# Patient Record
Sex: MALE | URBAN - METROPOLITAN AREA
[De-identification: names, ages, dates, MRNs, and addresses within clinical notes are randomized per-mention and may not be internally consistent; named-entity substitution may affect disease eponyms.]

---

## 2017-03-01 ENCOUNTER — APPOINTMENT (RX ONLY)
Dept: URBAN - METROPOLITAN AREA CLINIC 81 | Facility: CLINIC | Age: 82
Setting detail: DERMATOLOGY
End: 2017-03-01

## 2017-03-01 VITALS — DIASTOLIC BLOOD PRESSURE: 85 MMHG | SYSTOLIC BLOOD PRESSURE: 149 MMHG | HEART RATE: 73 BPM

## 2017-03-01 DIAGNOSIS — B35.3 TINEA PEDIS: ICD-10-CM

## 2017-03-01 DIAGNOSIS — L98429 CHRONIC ULCER OF OTHER SPECIFIED SITES: ICD-10-CM

## 2017-03-01 DIAGNOSIS — L98419 CHRONIC ULCER OF OTHER SPECIFIED SITES: ICD-10-CM

## 2017-03-01 DIAGNOSIS — L40.0 PSORIASIS VULGARIS: ICD-10-CM

## 2017-03-01 DIAGNOSIS — J06.9 ACUTE UPPER RESPIRATORY INFECTION, UNSPECIFIED: ICD-10-CM

## 2017-03-01 DIAGNOSIS — Z79.899 OTHER LONG TERM (CURRENT) DRUG THERAPY: ICD-10-CM

## 2017-03-01 DIAGNOSIS — B35.1 TINEA UNGUIUM: ICD-10-CM

## 2017-03-01 DIAGNOSIS — L81.4 OTHER MELANIN HYPERPIGMENTATION: ICD-10-CM

## 2017-03-01 PROBLEM — L29.8 OTHER PRURITUS: Status: ACTIVE | Noted: 2017-03-01

## 2017-03-01 PROBLEM — L98.499 NON-PRESSURE CHRONIC ULCER OF SKIN OF OTHER SITES WITH UNSPECIFIED SEVERITY: Status: ACTIVE | Noted: 2017-03-01

## 2017-03-01 PROCEDURE — ? NAIL CLIPPING FOR PAS

## 2017-03-01 PROCEDURE — ? ORDER TESTS

## 2017-03-01 PROCEDURE — ? TREATMENT REGIMEN

## 2017-03-01 PROCEDURE — ? HIGH RISK MEDICATION MONITORING

## 2017-03-01 PROCEDURE — ? PRESCRIPTION

## 2017-03-01 PROCEDURE — 99214 OFFICE O/P EST MOD 30 MIN: CPT

## 2017-03-01 PROCEDURE — ? COUNSELING

## 2017-03-01 RX ORDER — MUPIROCIN 20 MG/G
OINTMENT TOPICAL BID
Qty: 1 | Refills: 0 | Status: ERX | COMMUNITY
Start: 2017-03-01

## 2017-03-01 RX ORDER — KETOCONAZOLE 20 MG/G
CREAM TOPICAL BID
Qty: 1 | Refills: 4 | Status: ERX | COMMUNITY
Start: 2017-03-01

## 2017-03-01 RX ORDER — MOMETASONE FUROATE 1 MG/G
CREAM TOPICAL
Qty: 1 | Refills: 0 | Status: ERX | COMMUNITY
Start: 2017-03-01

## 2017-03-01 RX ORDER — BETAMETHASONE DIPROPIONATE 0.5 MG/G
LOTION TOPICAL
Qty: 1 | Refills: 2 | Status: ERX

## 2017-03-01 RX ADMIN — KETOCONAZOLE: 20 CREAM TOPICAL at 00:00

## 2017-03-01 RX ADMIN — MUPIROCIN: 20 OINTMENT TOPICAL at 00:00

## 2017-03-01 RX ADMIN — MOMETASONE FUROATE: 1 CREAM TOPICAL at 00:00

## 2017-03-01 ASSESSMENT — LOCATION SIMPLE DESCRIPTION DERM
LOCATION SIMPLE: POSTERIOR SCALP
LOCATION SIMPLE: LEFT UPPER ARM
LOCATION SIMPLE: RIGHT PRETIBIAL REGION
LOCATION SIMPLE: RIGHT FOOT
LOCATION SIMPLE: UPPER BACK
LOCATION SIMPLE: RIGHT GREAT TOE
LOCATION SIMPLE: LEFT PRETIBIAL REGION
LOCATION SIMPLE: LEFT FOOT
LOCATION SIMPLE: RIGHT UPPER ARM
LOCATION SIMPLE: GROIN

## 2017-03-01 ASSESSMENT — LOCATION ZONE DERM
LOCATION ZONE: SCALP
LOCATION ZONE: LEG
LOCATION ZONE: FEET
LOCATION ZONE: TOENAIL
LOCATION ZONE: ARM
LOCATION ZONE: TRUNK

## 2017-03-01 ASSESSMENT — LOCATION DETAILED DESCRIPTION DERM
LOCATION DETAILED: RIGHT GREAT TOENAIL
LOCATION DETAILED: RIGHT PROXIMAL PRETIBIAL REGION
LOCATION DETAILED: LEFT PROXIMAL PRETIBIAL REGION
LOCATION DETAILED: LEFT INGUINAL CREASE
LOCATION DETAILED: 1ST WEBSPACE RIGHT FOOT
LOCATION DETAILED: 1ST WEBSPACE LEFT FOOT
LOCATION DETAILED: LEFT PROXIMAL POSTERIOR UPPER ARM
LOCATION DETAILED: RIGHT DISTAL POSTERIOR UPPER ARM
LOCATION DETAILED: RIGHT INGUINAL CREASE
LOCATION DETAILED: POSTERIOR MID-PARIETAL SCALP
LOCATION DETAILED: INFERIOR THORACIC SPINE

## 2017-03-01 NOTE — PROCEDURE: TREATMENT REGIMEN
Continue Regimen: Betamethasone dipropionate 0.05% lotion - apply to affected areas of scalp once daily prn \\nCordran lotion 0.05% - apply to affected areas of posterior ears bid prn
Action 2: Continue
Decrease Regimen: Enbrel SureClick 50 mg/ml (0.98 ml) subcutaneous pen injector - inject 1 (50 mg pen) SubQ every other week (instead of 1 every week)
Start Regimen: Mometasone 0.1% cream - apply to affected areas of thighs, gluteal cleft, and perianal area twice daily for 2 weeks. Wait one week prior to repeating prn.
Samples Given: Cordran 0.05% lotion
Detail Level: Zone
Initiate Treatment: Ketoconazole 2% cream - apply to both feet and in between toes bid x 30 days
Initiate Treatment: Mupirocin 2% ointment - apply twice daily x 7 days to ulceration in the inguinal creases
Plan: Patient to follow up with an ENT for further evaluation and management

## 2017-03-01 NOTE — PROCEDURE: ORDER TESTS
Bill For Surgical Tray: no
Performing Laboratory: [id number]
Billing Type: Third-Party Bill
Billing Type: Third-Party Bill
Performing Laboratory: [id number]

## 2017-03-01 NOTE — PROCEDURE: NAIL CLIPPING FOR PAS
Add [id number] To Bill?: No
Lab: [id number]
Lab Facility: [id number]
Detail Level: Detailed
Billing Type: Third-Party Bill

## 2017-03-01 NOTE — PROCEDURE: COUNSELING
Quality 337: Tuberculosis Prevention For Psoriasis And Psoriatic Arthritis Patients On A Biological Immune Response Modifier: Patients has documentation of the management of a positive TB screening test with no evidence of active tuberculosis, confirmed through use of radiographic imaging (i.e., chest x -ray, CT)
Detail Level: Zone
Detail Level: Detailed
Detail Level: Generalized

## 2017-03-01 NOTE — PROCEDURE: HIGH RISK MEDICATION MONITORING
Quality 337: Tuberculosis Prevention For Psoriasis And Psoriatic Arthritis Patients On A Biological Immune Response Modifier: Patients has documentation of the management of a positive TB screening test with no evidence of active tuberculosis, confirmed through use of radiographic imaging (i.e., chest x -ray, CT)
Detail Level: Detailed

## 2017-03-06 ENCOUNTER — RX ONLY (OUTPATIENT)
Age: 82
Setting detail: RX ONLY
End: 2017-03-06

## 2017-03-06 RX ORDER — MUPIROCIN 20 MG/G
OINTMENT TOPICAL
Qty: 1 | Refills: 0 | Status: ERX

## 2017-03-06 RX ORDER — AMOXICILLIN AND CLAVULANATE POTASSIUM 500; 125 MG/1; 1/1
TABLET, FILM COATED ORAL
Qty: 14 | Refills: 0 | Status: ERX | COMMUNITY
Start: 2017-03-06

## 2017-04-13 ENCOUNTER — RX ONLY (OUTPATIENT)
Age: 82
Setting detail: RX ONLY
End: 2017-04-13

## 2017-04-13 RX ORDER — ETANERCEPT 50 MG/ML
SOLUTION SUBCUTANEOUS
Qty: 12 | Refills: 1 | Status: CANCELLED
Stop reason: CLARIF

## 2017-05-25 ENCOUNTER — RX ONLY (OUTPATIENT)
Age: 82
Setting detail: RX ONLY
End: 2017-05-25

## 2017-05-25 RX ORDER — ETANERCEPT 50 MG/ML
SOLUTION SUBCUTANEOUS
Qty: 12 | Refills: 0 | Status: ERX | COMMUNITY
Start: 2017-05-25

## 2017-08-17 ENCOUNTER — RX ONLY (OUTPATIENT)
Age: 82
Setting detail: RX ONLY
End: 2017-08-17

## 2017-08-17 RX ORDER — HYDROXYZINE HYDROCHLORIDE 10 MG/1
TABLET, FILM COATED ORAL
Qty: 60 | Refills: 3 | Status: CANCELLED
Stop reason: CLARIF

## 2017-09-06 ENCOUNTER — APPOINTMENT (RX ONLY)
Dept: URBAN - METROPOLITAN AREA CLINIC 81 | Facility: CLINIC | Age: 82
Setting detail: DERMATOLOGY
End: 2017-09-06

## 2017-09-06 DIAGNOSIS — L40.0 PSORIASIS VULGARIS: ICD-10-CM | Status: IMPROVED

## 2017-09-06 DIAGNOSIS — D17 BENIGN LIPOMATOUS NEOPLASM: ICD-10-CM

## 2017-09-06 DIAGNOSIS — L81.0 POSTINFLAMMATORY HYPERPIGMENTATION: ICD-10-CM

## 2017-09-06 DIAGNOSIS — Z79.899 OTHER LONG TERM (CURRENT) DRUG THERAPY: ICD-10-CM

## 2017-09-06 PROBLEM — D17.0 BENIGN LIPOMATOUS NEOPLASM OF SKIN AND SUBCUTANEOUS TISSUE OF HEAD, FACE AND NECK: Status: ACTIVE | Noted: 2017-09-06

## 2017-09-06 PROCEDURE — ? TREATMENT REGIMEN

## 2017-09-06 PROCEDURE — 99213 OFFICE O/P EST LOW 20 MIN: CPT

## 2017-09-06 PROCEDURE — ? ORDER TESTS

## 2017-09-06 PROCEDURE — ? COUNSELING

## 2017-09-06 PROCEDURE — ? HIGH RISK MEDICATION MONITORING

## 2017-09-06 ASSESSMENT — LOCATION DETAILED DESCRIPTION DERM
LOCATION DETAILED: INFERIOR THORACIC SPINE
LOCATION DETAILED: LEFT POSTERIOR NECK

## 2017-09-06 ASSESSMENT — LOCATION SIMPLE DESCRIPTION DERM
LOCATION SIMPLE: UPPER BACK
LOCATION SIMPLE: POSTERIOR NECK

## 2017-09-06 ASSESSMENT — LOCATION ZONE DERM
LOCATION ZONE: NECK
LOCATION ZONE: TRUNK

## 2017-09-06 NOTE — PROCEDURE: TREATMENT REGIMEN
Action 2: Continue
Detail Level: Zone
Other Instructions: patient given lab slip again to have necessary routine labs drawn for high risk medication monitoring\\n\\nf/u 6 months
Continue Regimen: Enbrel SureClick 50mg/mL SC  - inject one pen every other week\\nMometasone 0.1% cream - apply to affected areas of thighs, gluteal cleft, and perianal area twice daily for 2 weeks. Wait one week prior to repeating prn. \\nBetamethasone dipropionate 0.05 % lotion (Apply to affected areas of the scalp once daily prn\\n

## 2017-09-06 NOTE — PROCEDURE: MIPS QUALITY
Quality 337: Tuberculosis Prevention For Psoriasis And Psoriatic Arthritis Patients On A Biological Immune Response Modifier: Patients has documentation of the management of a positive TB screening test with no evidence of active tuberculosis, confirmed through use of radiographic imaging (i.e., chest x -ray, CT)
Quality 130: Documentation Of Current Medications In The Medical Record: Current Medications Documented
Detail Level: Detailed

## 2017-09-06 NOTE — PROCEDURE: ORDER TESTS
Expected Date Of Service: 09/06/2017
Lab Facility: [id number]
Billing Type: Third-Party Bill
Performing Laboratory: [id number]
Bill For Surgical Tray: no
Lab Facility: [id number]
Performing Laboratory: [id number]
Billing Type: Third-Party Bill

## 2018-01-26 ENCOUNTER — RX ONLY (OUTPATIENT)
Age: 83
Setting detail: RX ONLY
End: 2018-01-26

## 2018-01-26 RX ORDER — ETANERCEPT 50 MG/ML
SOLUTION SUBCUTANEOUS
Qty: 12 | Refills: 0 | Status: ERX

## 2018-03-15 ENCOUNTER — APPOINTMENT (RX ONLY)
Dept: URBAN - METROPOLITAN AREA CLINIC 81 | Facility: CLINIC | Age: 83
Setting detail: DERMATOLOGY
End: 2018-03-15

## 2018-03-15 DIAGNOSIS — L72.8 OTHER FOLLICULAR CYSTS OF THE SKIN AND SUBCUTANEOUS TISSUE: ICD-10-CM

## 2018-03-15 DIAGNOSIS — L40.8 OTHER PSORIASIS: ICD-10-CM

## 2018-03-15 PROCEDURE — 99213 OFFICE O/P EST LOW 20 MIN: CPT | Mod: 25

## 2018-03-15 PROCEDURE — ? INCISION AND DRAINAGE

## 2018-03-15 PROCEDURE — ? COUNSELING

## 2018-03-15 PROCEDURE — ? PRESCRIPTION

## 2018-03-15 PROCEDURE — 10060 I&D ABSCESS SIMPLE/SINGLE: CPT

## 2018-03-15 RX ORDER — MOMETASONE FUROATE 1 MG/G
CREAM TOPICAL QD
Qty: 1 | Refills: 2 | Status: ERX

## 2018-03-15 ASSESSMENT — PAIN INTENSITY VAS: HOW INTENSE IS YOUR PAIN 0 BEING NO PAIN, 10 BEING THE MOST SEVERE PAIN POSSIBLE?: NO PAIN

## 2018-03-15 ASSESSMENT — LOCATION DETAILED DESCRIPTION DERM: LOCATION DETAILED: LEFT INFERIOR LID MARGIN

## 2018-03-15 ASSESSMENT — LOCATION ZONE DERM: LOCATION ZONE: EYELID

## 2018-03-15 ASSESSMENT — LOCATION SIMPLE DESCRIPTION DERM: LOCATION SIMPLE: LEFT INFERIOR EYELID

## 2018-03-15 NOTE — PROCEDURE: INCISION AND DRAINAGE
Post-Care Instructions: I reviewed with the patient in detail post-care instructions. Patient should keep wound covered and call the office should any redness, pain, swelling or worsening occur.
Curette Text (Optional): After the contents were expressed a curette was used to partially remove the cyst wall.
Epidermal Closure: simple interrupted
Curette: No
Anesthesia Volume In Cc: 1
Suture Text: The incision was partially closed with
Lesion Type: Cyst
Dressing: no dressing
Preparation Text: The area was prepped in the usual clean fashion.
Anesthesia Type: 1% lidocaine with 1:100,000 epinephrine and 0.9% sodium chloride (tumescent anesthesia)
Method: comedo extractor
Detail Level: Detailed
Epidermal Sutures: 4-0 Ethilon
Size Of Lesion In Cm (Optional But May Be Required For Some Insurances): 0
Consent was obtained and risks were reviewed including but not limited to delayed wound healing, infection, need for multiple I and D's, and pain.

## 2018-08-30 ENCOUNTER — APPOINTMENT (RX ONLY)
Dept: URBAN - METROPOLITAN AREA CLINIC 81 | Facility: CLINIC | Age: 83
Setting detail: DERMATOLOGY
End: 2018-08-30

## 2018-08-30 DIAGNOSIS — L40.8 OTHER PSORIASIS: ICD-10-CM

## 2018-08-30 PROBLEM — L40.0 PSORIASIS VULGARIS: Status: ACTIVE | Noted: 2018-08-30

## 2018-08-30 PROCEDURE — ? PRESCRIPTION

## 2018-08-30 PROCEDURE — ? TREATMENT REGIMEN

## 2018-08-30 PROCEDURE — 99213 OFFICE O/P EST LOW 20 MIN: CPT

## 2018-08-30 PROCEDURE — ? COUNSELING

## 2018-08-30 RX ORDER — TACROLIMUS 1 MG/G
OINTMENT TOPICAL BID
Qty: 1 | Refills: 1 | Status: ERX | COMMUNITY
Start: 2018-08-30

## 2018-08-30 RX ORDER — MOMETASONE FUROATE 1 MG/G
CREAM TOPICAL BID
Qty: 1 | Refills: 1 | Status: ERX

## 2018-08-30 RX ADMIN — TACROLIMUS: 1 OINTMENT TOPICAL at 00:00

## 2018-08-30 ASSESSMENT — LOCATION ZONE DERM: LOCATION ZONE: TRUNK

## 2018-08-30 ASSESSMENT — PGA PSORIASIS: PGA PSORIASIS: MARKED (MARKED PLAQUE ELEVATION, BRIGHT ERYTHEMA, THICK NONTENACIOUS SCALE PREDOMINATES)

## 2018-08-30 ASSESSMENT — LOCATION SIMPLE DESCRIPTION DERM
LOCATION SIMPLE: GROIN
LOCATION SIMPLE: GLUTEAL CLEFT

## 2018-08-30 ASSESSMENT — LOCATION DETAILED DESCRIPTION DERM
LOCATION DETAILED: LEFT INGUINAL CREASE
LOCATION DETAILED: RIGHT INGUINAL CREASE
LOCATION DETAILED: GLUTEAL CLEFT

## 2018-08-30 ASSESSMENT — BSA PSORIASIS: % BODY COVERED IN PSORIASIS: 2

## 2018-08-30 NOTE — PROCEDURE: TREATMENT REGIMEN
Action 3: Continue
Continue Regimen: Enbrel 50mg/mL sc injections every 2 weeks\\n\\nMometasone 0.1% cream mixed with Tacrolimus 0.1% cream applied BID (2 weeks on, 2 weeks off)
Other Instructions: Patient to consider switching to Taltz 80mg/mL sc injections if topical treatment is ineffective. \\n\\nPatient competed Taltz start paperwork. \\n\\nPatient sent with [facility] lab slip to complete maintenance labs
Detail Level: Zone

## 2018-10-17 ENCOUNTER — RX ONLY (OUTPATIENT)
Age: 83
Setting detail: RX ONLY
End: 2018-10-17

## 2018-10-17 RX ORDER — SECUKINUMAB 150 MG/ML
INJECTION SUBCUTANEOUS
Qty: 7 | Refills: 0 | Status: ERX | COMMUNITY
Start: 2018-10-17

## 2019-05-09 ENCOUNTER — APPOINTMENT (RX ONLY)
Dept: URBAN - METROPOLITAN AREA CLINIC 81 | Facility: CLINIC | Age: 84
Setting detail: DERMATOLOGY
End: 2019-05-09

## 2019-05-09 DIAGNOSIS — L81.0 POSTINFLAMMATORY HYPERPIGMENTATION: ICD-10-CM

## 2019-05-09 DIAGNOSIS — L40.0 PSORIASIS VULGARIS: ICD-10-CM | Status: WELL CONTROLLED

## 2019-05-09 DIAGNOSIS — B37.2 CANDIDIASIS OF SKIN AND NAIL: ICD-10-CM

## 2019-05-09 DIAGNOSIS — B35.1 TINEA UNGUIUM: ICD-10-CM

## 2019-05-09 PROCEDURE — ? ORDER TESTS

## 2019-05-09 PROCEDURE — 87220 TISSUE EXAM FOR FUNGI: CPT

## 2019-05-09 PROCEDURE — ? DEFER

## 2019-05-09 PROCEDURE — ? DIAGNOSIS COMMENT

## 2019-05-09 PROCEDURE — ? KOH PREP

## 2019-05-09 PROCEDURE — 99213 OFFICE O/P EST LOW 20 MIN: CPT

## 2019-05-09 PROCEDURE — ? PRESCRIPTION

## 2019-05-09 PROCEDURE — ? TREATMENT REGIMEN

## 2019-05-09 PROCEDURE — ? COUNSELING

## 2019-05-09 RX ORDER — KETOCONAZOLE 20 MG/G
CREAM TOPICAL BID
Qty: 1 | Refills: 2 | Status: ERX

## 2019-05-09 ASSESSMENT — LOCATION DETAILED DESCRIPTION DERM
LOCATION DETAILED: RIGHT GREAT TOENAIL
LOCATION DETAILED: LEFT INGUINAL CREASE
LOCATION DETAILED: INFERIOR LUMBAR SPINE
LOCATION DETAILED: LEFT INFERIOR OCCIPITAL SCALP
LOCATION DETAILED: LEFT OCCIPITAL SCALP
LOCATION DETAILED: GLUTEAL CLEFT
LOCATION DETAILED: RIGHT INGUINAL CREASE
LOCATION DETAILED: PERIUMBILICAL SKIN

## 2019-05-09 ASSESSMENT — LOCATION SIMPLE DESCRIPTION DERM
LOCATION SIMPLE: GLUTEAL CLEFT
LOCATION SIMPLE: ABDOMEN
LOCATION SIMPLE: POSTERIOR SCALP
LOCATION SIMPLE: GROIN
LOCATION SIMPLE: LOWER BACK
LOCATION SIMPLE: RIGHT GREAT TOE

## 2019-05-09 ASSESSMENT — LOCATION ZONE DERM
LOCATION ZONE: TRUNK
LOCATION ZONE: SCALP
LOCATION ZONE: TOENAIL

## 2019-05-09 ASSESSMENT — PGA PSORIASIS: PGA PSORIASIS: MILD (MILD PLAQUE ELEVATION, LIGHT ERYTHEMA, FINE SCALE PREDOMINATES)

## 2019-05-09 ASSESSMENT — BSA PSORIASIS: % BODY COVERED IN PSORIASIS: 2

## 2019-05-09 NOTE — PROCEDURE: DIAGNOSIS COMMENT
Comment: patient does not want to stop Enbrel treatment, even though another biologic modifier drug may work more effectively.
Detail Level: Simple

## 2019-05-09 NOTE — PROCEDURE: TREATMENT REGIMEN
Detail Level: Zone
Action 4: Continue
Other Instructions: Patient given [facility] lab slip today.
Continue Regimen: Enbrel 50mg/mL sc injections every week
Initiate Treatment: Ketoconazole 2% Cream BID x 1 month

## 2019-05-09 NOTE — PROCEDURE: DEFER
Procedure To Be Performed At Next Visit: Prescription
Detail Level: Detailed
Introduction Text (Please End With A Colon): The following procedure was deferred:

## 2019-05-09 NOTE — PROCEDURE: KOH PREP
Detail Level: Detailed
Showing: budding yeast and branching hyphae
Koh Procedure Text (Tissue Harvesting Technique): A 15-blade scalpel was used to scrape the skin. The skin scrapings were placed on a glass slide, covered with a coverslip and a KOH solution was applied.
Koh Intro Text (From The.....): A KOH prep was ordered and evaluated from the

## 2019-05-09 NOTE — PROCEDURE: ORDER TESTS
Expected Date Of Service: 05/09/2019
Performing Laboratory: [id number]
Bill For Surgical Tray: no
Billing Type: Third-Party Bill

## 2019-05-15 ENCOUNTER — RX ONLY (OUTPATIENT)
Age: 84
Setting detail: RX ONLY
End: 2019-05-15

## 2019-05-15 RX ORDER — MUPIROCIN 20 MG/G
1 OINTMENT TOPICAL BID
Qty: 1 | Refills: 0 | Status: ERX

## 2019-05-15 RX ORDER — CLINDAMYCIN HYDROCHLORIDE 300 MG/1
CAPSULE ORAL
Qty: 21 | Refills: 0 | Status: ERX | COMMUNITY
Start: 2019-05-15

## 2020-02-03 ENCOUNTER — RX ONLY (OUTPATIENT)
Age: 85
Setting detail: RX ONLY
End: 2020-02-03

## 2020-02-03 RX ORDER — ETANERCEPT 50 MG/ML
50 MG/ML SOLUTION SUBCUTANEOUS WEEKLY
Qty: 12 | Refills: 1 | Status: ERX | COMMUNITY
Start: 2020-02-03

## 2020-02-04 ENCOUNTER — RX ONLY (OUTPATIENT)
Age: 85
Setting detail: RX ONLY
End: 2020-02-04

## 2020-02-04 RX ORDER — ETANERCEPT 50 MG/ML
SOLUTION SUBCUTANEOUS WEEKLY
Qty: 4 | Refills: 1 | Status: ERX

## 2020-02-11 ENCOUNTER — APPOINTMENT (RX ONLY)
Dept: URBAN - METROPOLITAN AREA CLINIC 81 | Facility: CLINIC | Age: 85
Setting detail: DERMATOLOGY
End: 2020-02-11

## 2020-02-11 DIAGNOSIS — L40.0 PSORIASIS VULGARIS: ICD-10-CM | Status: WELL CONTROLLED

## 2020-02-11 DIAGNOSIS — L72.0 EPIDERMAL CYST: ICD-10-CM

## 2020-02-11 PROCEDURE — ? COUNSELING

## 2020-02-11 PROCEDURE — ? INCISION AND DRAINAGE

## 2020-02-11 PROCEDURE — ? TREATMENT REGIMEN

## 2020-02-11 PROCEDURE — ? PRESCRIPTION

## 2020-02-11 PROCEDURE — 10060 I&D ABSCESS SIMPLE/SINGLE: CPT

## 2020-02-11 PROCEDURE — 99213 OFFICE O/P EST LOW 20 MIN: CPT | Mod: 25

## 2020-02-11 RX ORDER — MOMETASONE FUROATE 1 MG/G
CREAM TOPICAL BID
Qty: 1 | Refills: 2 | Status: ERX

## 2020-02-11 ASSESSMENT — LOCATION ZONE DERM
LOCATION ZONE: EYELID
LOCATION ZONE: ANUS

## 2020-02-11 ASSESSMENT — LOCATION DETAILED DESCRIPTION DERM
LOCATION DETAILED: LEFT INFERIOR LID MARGIN
LOCATION DETAILED: PERIANAL SKIN

## 2020-02-11 ASSESSMENT — PGA PSORIASIS: PGA PSORIASIS: CLEAR (NO ELEVATION, SCALE, OR ERYTHEMA, EXCEPT FOR RESIDUAL DISCOLORATION)

## 2020-02-11 ASSESSMENT — BSA PSORIASIS: % BODY COVERED IN PSORIASIS: 0

## 2020-02-11 ASSESSMENT — LOCATION SIMPLE DESCRIPTION DERM
LOCATION SIMPLE: PERIANAL SKIN
LOCATION SIMPLE: LEFT INFERIOR EYELID

## 2020-02-11 NOTE — PROCEDURE: INCISION AND DRAINAGE
Detail Level: Detailed
Lesion Type: Cyst
Method: 11 blade
Curette: No
Anesthesia Type: 1% lidocaine with 1:100,000 epinephrine and a 1:10 solution of 8.4% sodium bicarbonate
Anesthesia Volume In Cc: 0.3
Size Of Lesion In Cm (Optional But May Be Required For Some Insurances): 0
Dressing: no dressing
Epidermal Sutures: 4-0 Ethilon
Epidermal Closure: simple interrupted
Suture Text: The incision was partially closed with
Preparation Text: The area was prepped in the usual clean fashion.
Curette Text (Optional): After the contents were expressed a curette was used to partially remove the cyst wall.
Consent was obtained and risks were reviewed including but not limited to delayed wound healing, infection, need for multiple I and D's, and pain.
Post-Care Instructions: I reviewed with the patient in detail post-care instructions. Patient should keep wound covered and call the office should any redness, pain, swelling or worsening occur.

## 2020-02-11 NOTE — PROCEDURE: TREATMENT REGIMEN
Action 4: Continue
Start Regimen: Mometasone 0.1% Cream BID up to 7 days (for perianal irritation)
Otc Regimen: Gentle cleanser daily (for perianal irritation)
Detail Level: Zone
Continue Regimen: Enbrel 50mg/mL sc injections every week

## 2020-02-11 NOTE — PROCEDURE: COUNSELING
Detail Level: Detailed
Quality 410: Psoriasis Clinical Response To Oral Systemic Or Biologic Mediations: Psoriasis Assessment Tool Documented, Met Specified Benchmark
Quality 337: Tuberculosis Prevention For Psoriasis And Psoriatic Arthritis Patients On A Biological Immune Response Modifier: Patients has documentation of the management of a positive TB screening test with no evidence of active tuberculosis, confirmed through use of radiographic imaging (i.e., chest x -ray, CT)

## 2020-02-11 NOTE — PROCEDURE: MIPS QUALITY
Quality 110: Preventive Care And Screening: Influenza Immunization: Influenza Immunization Administered during Influenza season
Quality 337: Tuberculosis Prevention For Psoriasis And Psoriatic Arthritis Patients On A Biological Immune Response Modifier: Patients has documentation of the management of a positive TB screening test with no evidence of active tuberculosis, confirmed through use of radiographic imaging (i.e., chest x -ray, CT)
Quality 226: Preventive Care And Screening: Tobacco Use: Screening And Cessation Intervention: Patient screened for tobacco use and is an ex/non-smoker
Detail Level: Detailed
Quality 410: Psoriasis Clinical Response To Oral Systemic Or Biologic Mediations: Psoriasis Assessment Tool Documented, Met Specified Benchmark
Quality 130: Documentation Of Current Medications In The Medical Record: Current Medications Documented

## 2020-09-04 ENCOUNTER — APPOINTMENT (RX ONLY)
Dept: URBAN - METROPOLITAN AREA CLINIC 81 | Facility: CLINIC | Age: 85
Setting detail: DERMATOLOGY
End: 2020-09-04

## 2020-09-04 DIAGNOSIS — Z79.899 OTHER LONG TERM (CURRENT) DRUG THERAPY: ICD-10-CM

## 2020-09-04 DIAGNOSIS — L72.0 EPIDERMAL CYST: ICD-10-CM

## 2020-09-04 DIAGNOSIS — L40.0 PSORIASIS VULGARIS: ICD-10-CM

## 2020-09-04 PROCEDURE — ? INCISION AND DRAINAGE

## 2020-09-04 PROCEDURE — ? HIGH RISK MEDICATION MONITORING

## 2020-09-04 PROCEDURE — ? TREATMENT REGIMEN

## 2020-09-04 PROCEDURE — ? COUNSELING

## 2020-09-04 PROCEDURE — 99213 OFFICE O/P EST LOW 20 MIN: CPT | Mod: 25

## 2020-09-04 PROCEDURE — 10060 I&D ABSCESS SIMPLE/SINGLE: CPT

## 2020-09-04 ASSESSMENT — LOCATION DETAILED DESCRIPTION DERM
LOCATION DETAILED: LEFT INFERIOR LID MARGIN
LOCATION DETAILED: PERIANAL SKIN

## 2020-09-04 ASSESSMENT — LOCATION ZONE DERM
LOCATION ZONE: ANUS
LOCATION ZONE: EYELID

## 2020-09-04 ASSESSMENT — LOCATION SIMPLE DESCRIPTION DERM
LOCATION SIMPLE: LEFT INFERIOR EYELID
LOCATION SIMPLE: PERIANAL SKIN

## 2020-09-04 ASSESSMENT — BSA PSORIASIS: % BODY COVERED IN PSORIASIS: 1

## 2020-09-04 NOTE — PROCEDURE: TREATMENT REGIMEN
Action 4: Continue
Otc Regimen: Gentle cleanser daily (for perianal irritation)
Detail Level: Zone
Continue Regimen: Enbrel 50mg/mL sc injections every week\\n\\nMometasone 0.1% Cream BID up to 7 days (for perianal irritation)

## 2020-09-04 NOTE — PROCEDURE: HIGH RISK MEDICATION MONITORING
Tetracycline Pregnancy And Lactation Text: This medication is Pregnancy Category D and not consider safe during pregnancy. It is also excreted in breast milk.
5-Fu Pregnancy And Lactation Text: This medication is Pregnancy Category X and contraindicated in pregnancy and in women who may become pregnant. It is unknown if this medication is excreted in breast milk.
Otezla Counseling: The side effects of Otezla were discussed with the patient, including but not limited to worsening or new depression, weight loss, diarrhea, nausea, upper respiratory tract infection, and headache. Patient instructed to call the office should any adverse effect occur.  The patient verbalized understanding of the proper use and possible adverse effects of Otezla.  All the patient's questions and concerns were addressed.
Ketoconazole Pregnancy And Lactation Text: This medication is Pregnancy Category C and it isn't know if it is safe during pregnancy. It is also excreted in breast milk and breast feeding isn't recommended.
Cosentyx Counseling:  I discussed with the patient the risks of Cosentyx including but not limited to worsening of Crohn's disease, immunosuppression, allergic reactions and infections.  The patient understands that monitoring is required including a PPD at baseline and must alert us or the primary physician if symptoms of infection or other concerning signs are noted.
Topical Sulfur Applications Counseling: Topical Sulfur Counseling: Patient counseled that this medication may cause skin irritation or allergic reactions.  In the event of skin irritation, the patient was advised to reduce the amount of the drug applied or use it less frequently.   The patient verbalized understanding of the proper use and possible adverse effects of topical sulfur application.  All of the patient's questions and concerns were addressed.
Glycopyrrolate Counseling:  I discussed with the patient the risks of glycopyrrolate including but not limited to skin rash, drowsiness, dry mouth, difficulty urinating, and blurred vision.
Cephalexin Counseling: I counseled the patient regarding use of cephalexin as an antibiotic for prophylactic and/or therapeutic purposes. Cephalexin (commonly prescribed under brand name Keflex) is a cephalosporin antibiotic which is active against numerous classes of bacteria, including most skin bacteria. Side effects may include nausea, diarrhea, gastrointestinal upset, rash, hives, yeast infections, and in rare cases, hepatitis, kidney disease, seizures, fever, confusion, neurologic symptoms, and others. Patients with severe allergies to penicillin medications are cautioned that there is about a 10% incidence of cross-reactivity with cephalosporins. When possible, patients with penicillin allergies should use alternatives to cephalosporins for antibiotic therapy.
Simponi Pregnancy And Lactation Text: The risk during pregnancy and breastfeeding is uncertain with this medication.
Thalidomide Pregnancy And Lactation Text: This medication is Pregnancy Category X and is absolutely contraindicated during pregnancy. It is unknown if it is excreted in breast milk.
Albendazole Pregnancy And Lactation Text: This medication is Pregnancy Category C and it isn't known if it is safe during pregnancy. It is also excreted in breast milk.
Xeljanz Counseling: I discussed with the patient the risks of Xeljanz therapy including increased risk of infection, liver issues, headache, diarrhea, or cold symptoms. Live vaccines should be avoided. They were instructed to call if they have any problems.
Solaraze Counseling:  I discussed with the patient the risks of Solaraze including but not limited to erythema, scaling, itching, weeping, crusting, and pain.
Drysol Counseling:  I discussed with the patient the risks of drysol/aluminum chloride including but not limited to skin rash, itching, irritation, burning.
Cyclophosphamide Counseling:  I discussed with the patient the risks of cyclophosphamide including but not limited to hair loss, hormonal abnormalities, decreased fertility, abdominal pain, diarrhea, nausea and vomiting, bone marrow suppression and infection. The patient understands that monitoring is required while taking this medication.
Imiquimod Counseling:  I discussed with the patient the risks of imiquimod including but not limited to erythema, scaling, itching, weeping, crusting, and pain.  Patient understands that the inflammatory response to imiquimod is variable from person to person and was educated regarded proper titration schedule.  If flu-like symptoms develop, patient knows to discontinue the medication and contact us.
Sski Pregnancy And Lactation Text: This medication is Pregnancy Category D and isn't considered safe during pregnancy. It is excreted in breast milk.
Acitretin Counseling:  I discussed with the patient the risks of acitretin including but not limited to hair loss, dry lips/skin/eyes, liver damage, hyperlipidemia, depression/suicidal ideation, photosensitivity.  Serious rare side effects can include but are not limited to pancreatitis, pseudotumor cerebri, bony changes, clot formation/stroke/heart attack.  Patient understands that alcohol is contraindicated since it can result in liver toxicity and significantly prolong the elimination of the drug by many years.
Colchicine Counseling:  Patient counseled regarding adverse effects including but not limited to stomach upset (nausea, vomiting, stomach pain, or diarrhea).  Patient instructed to limit alcohol consumption while taking this medication.  Colchicine may reduce blood counts especially with prolonged use.  The patient understands that monitoring of kidney function and blood counts may be required, especially at baseline. The patient verbalized understanding of the proper use and possible adverse effects of colchicine.  All of the patient's questions and concerns were addressed.
Clindamycin Pregnancy And Lactation Text: This medication can be used in pregnancy if certain situations. Clindamycin is also present in breast milk.
Otezla Pregnancy And Lactation Text: This medication is Pregnancy Category C and it isn't known if it is safe during pregnancy. It is unknown if it is excreted in breast milk.
Skyrizi Counseling: I discussed with the patient the risks of risankizumab-rzaa including but not limited to immunosuppression, and serious infections.  The patient understands that monitoring is required including a PPD at baseline and must alert us or the primary physician if symptoms of infection or other concerning signs are noted.
Benzoyl Peroxide Counseling: Patient counseled that medicine may cause skin irritation and bleach clothing.  In the event of skin irritation, the patient was advised to reduce the amount of the drug applied or use it less frequently.   The patient verbalized understanding of the proper use and possible adverse effects of benzoyl peroxide.  All of the patient's questions and concerns were addressed.
Cosentyx Pregnancy And Lactation Text: This medication is Pregnancy Category B and is considered safe during pregnancy. It is unknown if this medication is excreted in breast milk.
Terbinafine Counseling: Patient counseling regarding adverse effects of terbinafine including but not limited to headache, diarrhea, rash, upset stomach, liver function test abnormalities, itching, taste/smell disturbance, nausea, abdominal pain, and flatulence.  There is a rare possibility of liver failure that can occur when taking terbinafine.  The patient understands that a baseline LFT and kidney function test may be required. The patient verbalized understanding of the proper use and possible adverse effects of terbinafine.  All of the patient's questions and concerns were addressed.
Topical Sulfur Applications Pregnancy And Lactation Text: This medication is Pregnancy Category C and has an unknown safety profile during pregnancy. It is unknown if this topical medication is excreted in breast milk.
Glycopyrrolate Pregnancy And Lactation Text: This medication is Pregnancy Category B and is considered safe during pregnancy. It is unknown if it is excreted breast milk.
Valtrex Counseling: I discussed with the patient the risks of valacyclovir including but not limited to kidney damage, nausea, vomiting and severe allergy.  The patient understands that if the infection seems to be worsening or is not improving, they are to call.
Cephalexin Pregnancy And Lactation Text: This medication is Pregnancy Category B and considered safe during pregnancy.  It is also excreted in breast milk but can be used safely for shorter doses.
Fluconazole Counseling:  Patient counseled regarding adverse effects of fluconazole including but not limited to headache, diarrhea, nausea, upset stomach, liver function test abnormalities, taste disturbance, and stomach pain.  There is a rare possibility of liver failure that can occur when taking fluconazole.  The patient understands that monitoring of LFTs and kidney function test may be required, especially at baseline. The patient verbalized understanding of the proper use and possible adverse effects of fluconazole.  All of the patient's questions and concerns were addressed.
Quinolones Counseling:  I discussed with the patient the risks of fluoroquinolones including but not limited to GI upset, allergic reaction, drug rash, diarrhea, dizziness, photosensitivity, yeast infections, liver function test abnormalities, tendonitis/tendon rupture.
Infliximab Counseling:  I discussed with the patient the risks of infliximab including but not limited to myelosuppression, immunosuppression, autoimmune hepatitis, demyelinating diseases, lymphoma, and serious infections.  The patient understands that monitoring is required including a PPD at baseline and must alert us or the primary physician if symptoms of infection or other concerning signs are noted.
Ivermectin Counseling:  Patient instructed to take medication on an empty stomach with a full glass of water.  Patient informed of potential adverse effects including but not limited to nausea, diarrhea, dizziness, itching, and swelling of the extremities or lymph nodes.  The patient verbalized understanding of the proper use and possible adverse effects of ivermectin.  All of the patient's questions and concerns were addressed.
Solaraze Pregnancy And Lactation Text: This medication is Pregnancy Category B and is considered safe. There is some data to suggest avoiding during the third trimester. It is unknown if this medication is excreted in breast milk.
Colchicine Pregnancy And Lactation Text: This medication is Pregnancy Category C and isn't considered safe during pregnancy. It is excreted in breast milk.
Dupixent Counseling: I discussed with the patient the risks of dupilumab including but not limited to eye infection and irritation, cold sores, injection site reactions, worsening of asthma, allergic reactions and increased risk of parasitic infection.  Live vaccines should be avoided while taking dupilumab. Dupilumab will also interact with certain medications such as warfarin and cyclosporine. The patient understands that monitoring is required and they must alert us or the primary physician if symptoms of infection or other concerning signs are noted.
Terbinafine Pregnancy And Lactation Text: This medication is Pregnancy Category B and is considered safe during pregnancy. It is also excreted in breast milk and breast feeding isn't recommended.
Acitretin Pregnancy And Lactation Text: This medication is Pregnancy Category X and should not be given to women who are pregnant or may become pregnant in the future. This medication is excreted in breast milk.
Xelteriz Pregnancy And Lactation Text: This medication is Pregnancy Category D and is not considered safe during pregnancy.  The risk during breast feeding is also uncertain.
Cyclophosphamide Pregnancy And Lactation Text: This medication is Pregnancy Category D and it isn't considered safe during pregnancy. This medication is excreted in breast milk.
Imiquimod Pregnancy And Lactation Text: This medication is Pregnancy Category C. It is unknown if this medication is excreted in breast milk.
Doxycycline Counseling:  Patient counseled regarding possible photosensitivity and increased risk for sunburn.  Patient instructed to avoid sunlight, if possible.  When exposed to sunlight, patients should wear protective clothing, sunglasses, and sunscreen.  The patient was instructed to call the office immediately if the following severe adverse effects occur:  hearing changes, easy bruising/bleeding, severe headache, or vision changes.  The patient verbalized understanding of the proper use and possible adverse effects of doxycycline.  All of the patient's questions and concerns were addressed.
Oxybutynin Counseling:  I discussed with the patient the risks of oxybutynin including but not limited to skin rash, drowsiness, dry mouth, difficulty urinating, and blurred vision.
Clindamycin Counseling: I counseled the patient regarding use of clindamycin as an antibiotic for prophylactic and/or therapeutic purposes. Clindamycin is active against numerous classes of bacteria, including skin bacteria. Side effects may include nausea, diarrhea, gastrointestinal upset, rash, hives, yeast infections, and in rare cases, colitis.
Drysol Pregnancy And Lactation Text: This medication is considered safe during pregnancy and breast feeding.
Quinolones Pregnancy And Lactation Text: This medication is Pregnancy Category C and it isn't know if it is safe during pregnancy. It is also excreted in breast milk.
Topical Retinoid counseling:  Patient advised to apply a pea-sized amount only at bedtime and wait 30 minutes after washing their face before applying.  If too drying, patient may add a non-comedogenic moisturizer. The patient verbalized understanding of the proper use and possible adverse effects of retinoids.  All of the patient's questions and concerns were addressed.
Hydroxychloroquine Counseling:  I discussed with the patient that a baseline ophthalmologic exam is needed at the start of therapy and every year thereafter while on therapy. A CBC may also be warranted for monitoring.  The side effects of this medication were discussed with the patient, including but not limited to agranulocytosis, aplastic anemia, seizures, rashes, retinopathy, and liver toxicity. Patient instructed to call the office should any adverse effect occur.  The patient verbalized understanding of the proper use and possible adverse effects of Plaquenil.  All the patient's questions and concerns were addressed.
Zyclara Counseling:  I discussed with the patient the risks of imiquimod including but not limited to erythema, scaling, itching, weeping, crusting, and pain.  Patient understands that the inflammatory response to imiquimod is variable from person to person and was educated regarded proper titration schedule.  If flu-like symptoms develop, patient knows to discontinue the medication and contact us.
Dapsone Counseling: I discussed with the patient the risks of dapsone including but not limited to hemolytic anemia, agranulocytosis, rashes, methemoglobinemia, kidney failure, peripheral neuropathy, headaches, GI upset, and liver toxicity.  Patients who start dapsone require monitoring including baseline LFTs and weekly CBCs for the first month, then every month thereafter.  The patient verbalized understanding of the proper use and possible adverse effects of dapsone.  All of the patient's questions and concerns were addressed.
Valtrex Pregnancy And Lactation Text: this medication is Pregnancy Category B and is considered safe during pregnancy. This medication is not directly found in breast milk but it's metabolite acyclovir is present.
Detail Level: Detailed
Dupixent Pregnancy And Lactation Text: This medication likely crosses the placenta but the risk for the fetus is uncertain. This medication is excreted in breast milk.
Cyclosporine Counseling:  I discussed with the patient the risks of cyclosporine including but not limited to hypertension, gingival hyperplasia,myelosuppression, immunosuppression, liver damage, kidney damage, neurotoxicity, lymphoma, and serious infections. The patient understands that monitoring is required including baseline blood pressure, CBC, CMP, lipid panel and uric acid, and then 1-2 times monthly CMP and blood pressure.
Xolair Counseling:  Patient informed of potential adverse effects including but not limited to fever, muscle aches, rash and allergic reactions.  The patient verbalized understanding of the proper use and possible adverse effects of Xolair.  All of the patient's questions and concerns were addressed.
Minoxidil Counseling: Minoxidil is a topical medication which can increase blood flow where it is applied. It is uncertain how this medication increases hair growth. Side effects are uncommon and include stinging and allergic reactions.
Bexarotene Counseling:  I discussed with the patient the risks of bexarotene including but not limited to hair loss, dry lips/skin/eyes, liver abnormalities, hyperlipidemia, pancreatitis, depression/suicidal ideation, photosensitivity, drug rash/allergic reactions, hypothyroidism, anemia, leukopenia, infection, cataracts, and teratogenicity.  Patient understands that they will need regular blood tests to check lipid profile, liver function tests, white blood cell count, thyroid function tests and pregnancy test if applicable.
Stelara Counseling:  I discussed with the patient the risks of ustekinumab including but not limited to immunosuppression, malignancy, posterior leukoencephalopathy syndrome, and serious infections.  The patient understands that monitoring is required including a PPD at baseline and must alert us or the primary physician if symptoms of infection or other concerning signs are noted.
Elidel Counseling: Patient may experience a mild burning sensation during topical application. Elidel is not approved in children less than 2 years of age. There have been case reports of hematologic and skin malignancies in patients using topical calcineurin inhibitors although causality is questionable.
Oxybutynin Pregnancy And Lactation Text: This medication is Pregnancy Category B and is considered safe during pregnancy. It is unknown if it is excreted in breast milk.
Doxycycline Pregnancy And Lactation Text: This medication is Pregnancy Category D and not consider safe during pregnancy. It is also excreted in breast milk but is considered safe for shorter treatment courses.
Hydroxychloroquine Pregnancy And Lactation Text: This medication has been shown to cause fetal harm but it isn't assigned a Pregnancy Risk Category. There are small amounts excreted in breast milk.
Griseofulvin Counseling:  I discussed with the patient the risks of griseofulvin including but not limited to photosensitivity, cytopenia, liver damage, nausea/vomiting and severe allergy.  The patient understands that this medication is best absorbed when taken with a fatty meal (e.g., ice cream or french fries).
Rifampin Counseling: I discussed with the patient the risks of rifampin including but not limited to liver damage, kidney damage, red-orange body fluids, nausea/vomiting and severe allergy.
Cimetidine Counseling:  I discussed with the patient the risks of Cimetidine including but not limited to gynecomastia, headache, diarrhea, nausea, drowsiness, arrhythmias, pancreatitis, skin rashes, psychosis, bone marrow suppression and kidney toxicity.
Minoxidil Pregnancy And Lactation Text: This medication has not been assigned a Pregnancy Risk Category but animal studies failed to show danger with the topical medication. It is unknown if the medication is excreted in breast milk.
Cyclosporine Pregnancy And Lactation Text: This medication is Pregnancy Category C and it isn't know if it is safe during pregnancy. This medication is excreted in breast milk.
Dapsone Pregnancy And Lactation Text: This medication is Pregnancy Category C and is not considered safe during pregnancy or breast feeding.
Rituxan Counseling:  I discussed with the patient the risks of Rituxan infusions. Side effects can include infusion reactions, severe drug rashes including mucocutaneous reactions, reactivation of latent hepatitis and other infections and rarely progressive multifocal leukoencephalopathy.  All of the patient's questions and concerns were addressed.
Xolair Pregnancy And Lactation Text: This medication is Pregnancy Category B and is considered safe during pregnancy. This medication is excreted in breast milk.
Use Enhanced Medication Counseling?: No
Enbrel Counseling:  I discussed with the patient the risks of etanercept including but not limited to myelosuppression, immunosuppression, autoimmune hepatitis, demyelinating diseases, lymphoma, and infections.  The patient understands that monitoring is required including a PPD at baseline and must alert us or the primary physician if symptoms of infection or other concerning signs are noted.
Bexarotene Pregnancy And Lactation Text: This medication is Pregnancy Category X and should not be given to women who are pregnant or may become pregnant. This medication should not be used if you are breast feeding.
Erythromycin Counseling:  I discussed with the patient the risks of erythromycin including but not limited to GI upset, allergic reaction, drug rash, diarrhea, increase in liver enzymes, and yeast infections.
Birth Control Pills Counseling: Birth Control Pill Counseling: I discussed with the patient the potential side effects of OCPs including but not limited to increased risk of stroke, heart attack, thrombophlebitis, deep venous thrombosis, hepatic adenomas, breast changes, GI upset, headaches, and depression.  The patient verbalized understanding of the proper use and possible adverse effects of OCPs. All of the patient's questions and concerns were addressed.
Doxepin Pregnancy And Lactation Text: This medication is Pregnancy Category C and it isn't known if it is safe during pregnancy. It is also excreted in breast milk and breast feeding isn't recommended.
Tazorac Counseling:  Patient advised that medication is irritating and drying.  Patient may need to apply sparingly and wash off after an hour before eventually leaving it on overnight.  The patient verbalized understanding of the proper use and possible adverse effects of tazorac.  All of the patient's questions and concerns were addressed.
Azithromycin Counseling:  I discussed with the patient the risks of azithromycin including but not limited to GI upset, allergic reaction, drug rash, diarrhea, and yeast infections.
Griseofulvin Pregnancy And Lactation Text: This medication is Pregnancy Category X and is known to cause serious birth defects. It is unknown if this medication is excreted in breast milk but breast feeding should be avoided.
Benzoyl Peroxide Pregnancy And Lactation Text: This medication is Pregnancy Category C. It is unknown if benzoyl peroxide is excreted in breast milk.
Rifampin Pregnancy And Lactation Text: This medication is Pregnancy Category C and it isn't know if it is safe during pregnancy. It is also excreted in breast milk and should not be used if you are breast feeding.
Nsaids Counseling: NSAID Counseling: I discussed with the patient that NSAIDs should be taken with food. Prolonged use of NSAIDs can result in the development of stomach ulcers.  Patient advised to stop taking NSAIDs if abdominal pain occurs.  The patient verbalized understanding of the proper use and possible adverse effects of NSAIDs.  All of the patient's questions and concerns were addressed.
Isotretinoin Counseling: Patient should get monthly blood tests, not donate blood, not drive at night if vision affected, not share medication, and not undergo elective surgery for 6 months after tx completed. Side effects reviewed, pt to contact office should one occur.
Erivedge Counseling- I discussed with the patient the risks of Erivedge including but not limited to nausea, vomiting, diarrhea, constipation, weight loss, changes in the sense of taste, decreased appetite, muscle spasms, and hair loss.  The patient verbalized understanding of the proper use and possible adverse effects of Erivedge.  All of the patient's questions and concerns were addressed.
Methotrexate Counseling:  Patient counseled regarding adverse effects of methotrexate including but not limited to nausea, vomiting, abnormalities in liver function tests. Patients may develop mouth sores, rash, diarrhea, and abnormalities in blood counts. The patient understands that monitoring is required including LFT's and blood counts.  There is a rare possibility of scarring of the liver and lung problems that can occur when taking methotrexate. Persistent nausea, loss of appetite, pale stools, dark urine, cough, and shortness of breath should be reported immediately. Patient advised to discontinue methotrexate treatment at least three months before attempting to become pregnant.  I discussed the need for folate supplements while taking methotrexate.  These supplements can decrease side effects during methotrexate treatment. The patient verbalized understanding of the proper use and possible adverse effects of methotrexate.  All of the patient's questions and concerns were addressed.
Picato Counseling:  I discussed with the patient the risks of Picato including but not limited to erythema, scaling, itching, weeping, crusting, and pain.
Rituxan Pregnancy And Lactation Text: This medication is Pregnancy Category C and it isn't know if it is safe during pregnancy. It is unknown if this medication is excreted in breast milk but similar antibodies are known to be excreted.
Eucrisa Counseling: Patient may experience a mild burning sensation during topical application. Eucrisa is not approved in children less than 2 years of age.
Erythromycin Pregnancy And Lactation Text: This medication is Pregnancy Category B and is considered safe during pregnancy. It is also excreted in breast milk.
Arava Counseling:  Patient counseled regarding adverse effects of Arava including but not limited to nausea, vomiting, abnormalities in liver function tests. Patients may develop mouth sores, rash, diarrhea, and abnormalities in blood counts. The patient understands that monitoring is required including LFTs and blood counts.  There is a rare possibility of scarring of the liver and lung problems that can occur when taking methotrexate. Persistent nausea, loss of appetite, pale stools, dark urine, cough, and shortness of breath should be reported immediately. Patient advised to discontinue Arava treatment and consult with a physician prior to attempting conception. The patient will have to undergo a treatment to eliminate Arava from the body prior to conception.
Doxepin Counseling:  Patient advised that the medication is sedating and not to drive a car after taking this medication. Patient informed of potential adverse effects including but not limited to dry mouth, urinary retention, and blurry vision.  The patient verbalized understanding of the proper use and possible adverse effects of doxepin.  All of the patient's questions and concerns were addressed.
Hydroxyzine Counseling: Patient advised that the medication is sedating and not to drive a car after taking this medication.  Patient informed of potential adverse effects including but not limited to dry mouth, urinary retention, and blurry vision.  The patient verbalized understanding of the proper use and possible adverse effects of hydroxyzine.  All of the patient's questions and concerns were addressed.
Azathioprine Counseling:  I discussed with the patient the risks of azathioprine including but not limited to myelosuppression, immunosuppression, hepatotoxicity, lymphoma, and infections.  The patient understands that monitoring is required including baseline LFTs, Creatinine, possible TPMP genotyping and weekly CBCs for the first month and then every 2 weeks thereafter.  The patient verbalized understanding of the proper use and possible adverse effects of azathioprine.  All of the patient's questions and concerns were addressed.
Birth Control Pills Pregnancy And Lactation Text: This medication should be avoided if pregnant and for the first 30 days post-partum.
Taltz Counseling: I discussed with the patient the risks of ixekizumab including but not limited to immunosuppression, serious infections, worsening of inflammatory bowel disease and drug reactions.  The patient understands that monitoring is required including a PPD at baseline and must alert us or the primary physician if symptoms of infection or other concerning signs are noted.
Carac Counseling:  I discussed with the patient the risks of Carac including but not limited to erythema, scaling, itching, weeping, crusting, and pain.
Azithromycin Pregnancy And Lactation Text: This medication is considered safe during pregnancy and is also secreted in breast milk.
Siliq Counseling:  I discussed with the patient the risks of Siliq including but not limited to new or worsening depression, suicidal thoughts and behavior, immunosuppression, malignancy, posterior leukoencephalopathy syndrome, and serious infections.  The patient understands that monitoring is required including a PPD at baseline and must alert us or the primary physician if symptoms of infection or other concerning signs are noted. There is also a special program designed to monitor depression which is required with Siliq.
Methotrexate Pregnancy And Lactation Text: This medication is Pregnancy Category X and is known to cause fetal harm. This medication is excreted in breast milk.
Sarecycline Counseling: Patient advised regarding possible photosensitivity and discoloration of the teeth, skin, lips, tongue and gums.  Patient instructed to avoid sunlight, if possible.  When exposed to sunlight, patients should wear protective clothing, sunglasses, and sunscreen.  The patient was instructed to call the office immediately if the following severe adverse effects occur:  hearing changes, easy bruising/bleeding, severe headache, or vision changes.  The patient verbalized understanding of the proper use and possible adverse effects of sarecycline.  All of the patient's questions and concerns were addressed.
Nsaids Pregnancy And Lactation Text: These medications are considered safe up to 30 weeks gestation. It is excreted in breast milk.
Itraconazole Counseling:  I discussed with the patient the risks of itraconazole including but not limited to liver damage, nausea/vomiting, neuropathy, and severe allergy.  The patient understands that this medication is best absorbed when taken with acidic beverages such as non-diet cola or ginger ale.  The patient understands that monitoring is required including baseline LFTs and repeat LFTs at intervals.  The patient understands that they are to contact us or the primary physician if concerning signs are noted.
Isotretinoin Pregnancy And Lactation Text: This medication is Pregnancy Category X and is considered extremely dangerous during pregnancy. It is unknown if it is excreted in breast milk.
Tazorac Pregnancy And Lactation Text: This medication is not safe during pregnancy. It is unknown if this medication is excreted in breast milk.
Spironolactone Counseling: Patient advised regarding risks of diarrhea, abdominal pain, hyperkalemia, birth defects (for female patients), liver toxicity and renal toxicity. The patient may need blood work to monitor liver and kidney function and potassium levels while on therapy. The patient verbalized understanding of the proper use and possible adverse effects of spironolactone.  All of the patient's questions and concerns were addressed.
Metronidazole Counseling:  I discussed with the patient the risks of metronidazole including but not limited to seizures, nausea/vomiting, a metallic taste in the mouth, nausea/vomiting and severe allergy.
Humira Counseling:  I discussed with the patient the risks of adalimumab including but not limited to myelosuppression, immunosuppression, autoimmune hepatitis, demyelinating diseases, lymphoma, and serious infections.  The patient understands that monitoring is required including a PPD at baseline and must alert us or the primary physician if symptoms of infection or other concerning signs are noted.
Cimzia Counseling:  I discussed with the patient the risks of Cimzia including but not limited to immunosuppression, allergic reactions and infections.  The patient understands that monitoring is required including a PPD at baseline and must alert us or the primary physician if symptoms of infection or other concerning signs are noted.
Hydroxyzine Pregnancy And Lactation Text: This medication is not safe during pregnancy and should not be taken. It is also excreted in breast milk and breast feeding isn't recommended.
Azathioprine Pregnancy And Lactation Text: This medication is Pregnancy Category D and isn't considered safe during pregnancy. It is unknown if this medication is excreted in breast milk.
Bactrim Counseling:  I discussed with the patient the risks of sulfa antibiotics including but not limited to GI upset, allergic reaction, drug rash, diarrhea, dizziness, photosensitivity, and yeast infections.  Rarely, more serious reactions can occur including but not limited to aplastic anemia, agranulocytosis, methemoglobinemia, blood dyscrasias, liver or kidney failure, lung infiltrates or desquamative/blistering drug rashes.
Odomzo Counseling- I discussed with the patient the risks of Odomzo including but not limited to nausea, vomiting, diarrhea, constipation, weight loss, changes in the sense of taste, decreased appetite, muscle spasms, and hair loss.  The patient verbalized understanding of the proper use and possible adverse effects of Odomzo.  All of the patient's questions and concerns were addressed.
Protopic Counseling: Patient may experience a mild burning sensation during topical application. Protopic is not approved in children less than 2 years of age. There have been case reports of hematologic and skin malignancies in patients using topical calcineurin inhibitors although causality is questionable.
Clofazimine Counseling:  I discussed with the patient the risks of clofazimine including but not limited to skin and eye pigmentation, liver damage, nausea/vomiting, gastrointestinal bleeding and allergy.
High Dose Vitamin A Counseling: Side effects reviewed, pt to contact office should one occur.
Prednisone Counseling:  I discussed with the patient the risks of prolonged use of prednisone including but not limited to weight gain, insomnia, osteoporosis, mood changes, diabetes, susceptibility to infection, glaucoma and high blood pressure.  In cases where prednisone use is prolonged, patients should be monitored with blood pressure checks, serum glucose levels and an eye exam.  Additionally, the patient may need to be placed on GI prophylaxis, PCP prophylaxis, and calcium and vitamin D supplementation and/or a bisphosphonate.  The patient verbalized understanding of the proper use and the possible adverse effects of prednisone.  All of the patient's questions and concerns were addressed.
Topical Clindamycin Counseling: Patient counseled that this medication may cause skin irritation or allergic reactions.  In the event of skin irritation, the patient was advised to reduce the amount of the drug applied or use it less frequently.   The patient verbalized understanding of the proper use and possible adverse effects of clindamycin.  All of the patient's questions and concerns were addressed.
Gabapentin Counseling: I discussed with the patient the risks of gabapentin including but not limited to dizziness, somnolence, fatigue and ataxia.
Tremfya Counseling: I discussed with the patient the risks of guselkumab including but not limited to immunosuppression, serious infections, worsening of inflammatory bowel disease and drug reactions.  The patient understands that monitoring is required including a PPD at baseline and must alert us or the primary physician if symptoms of infection or other concerning signs are noted.
Spironolactone Pregnancy And Lactation Text: This medication can cause feminization of the male fetus and should be avoided during pregnancy. The active metabolite is also found in breast milk.
Hydroquinone Counseling:  Patient advised that medication may result in skin irritation, lightening (hypopigmentation), dryness, and burning.  In the event of skin irritation, the patient was advised to reduce the amount of the drug applied or use it less frequently.  Rarely, spots that are treated with hydroquinone can become darker (pseudoochronosis).  Should this occur, patient instructed to stop medication and call the office. The patient verbalized understanding of the proper use and possible adverse effects of hydroquinone.  All of the patient's questions and concerns were addressed.
Metronidazole Pregnancy And Lactation Text: This medication is Pregnancy Category B and considered safe during pregnancy.  It is also excreted in breast milk.
Ketoconazole Counseling:   Patient counseled regarding improving absorption with orange juice.  Adverse effects include but are not limited to breast enlargement, headache, diarrhea, nausea, upset stomach, liver function test abnormalities, taste disturbance, and stomach pain.  There is a rare possibility of liver failure that can occur when taking ketoconazole. The patient understands that monitoring of LFTs may be required, especially at baseline. The patient verbalized understanding of the proper use and possible adverse effects of ketoconazole.  All of the patient's questions and concerns were addressed.
5-Fu Counseling: 5-Fluorouracil Counseling:  I discussed with the patient the risks of 5-fluorouracil including but not limited to erythema, scaling, itching, weeping, crusting, and pain.
Tetracycline Counseling: Patient counseled regarding possible photosensitivity and increased risk for sunburn.  Patient instructed to avoid sunlight, if possible.  When exposed to sunlight, patients should wear protective clothing, sunglasses, and sunscreen.  The patient was instructed to call the office immediately if the following severe adverse effects occur:  hearing changes, easy bruising/bleeding, severe headache, or vision changes.  The patient verbalized understanding of the proper use and possible adverse effects of tetracycline.  All of the patient's questions and concerns were addressed. Patient understands to avoid pregnancy while on therapy due to potential birth defects.
Cimzia Pregnancy And Lactation Text: This medication crosses the placenta but can be considered safe in certain situations. Cimzia may be excreted in breast milk.
Cellcept Counseling:  I discussed with the patient the risks of mycophenolate mofetil including but not limited to infection/immunosuppression, GI upset, hypokalemia, hypercholesterolemia, bone marrow suppression, lymphoproliferative disorders, malignancy, GI ulceration/bleed/perforation, colitis, interstitial lung disease, kidney failure, progressive multifocal leukoencephalopathy, and birth defects.  The patient understands that monitoring is required including a baseline creatinine and regular CBC testing. In addition, patient must alert us immediately if symptoms of infection or other concerning signs are noted.
Bactrim Pregnancy And Lactation Text: This medication is Pregnancy Category D and is known to cause fetal risk.  It is also excreted in breast milk.
Albendazole Counseling:  I discussed with the patient the risks of albendazole including but not limited to cytopenia, kidney damage, nausea/vomiting and severe allergy.  The patient understands that this medication is being used in an off-label manner.
Simponi Counseling:  I discussed with the patient the risks of golimumab including but not limited to myelosuppression, immunosuppression, autoimmune hepatitis, demyelinating diseases, lymphoma, and serious infections.  The patient understands that monitoring is required including a PPD at baseline and must alert us or the primary physician if symptoms of infection or other concerning signs are noted.
Thalidomide Counseling: I discussed with the patient the risks of thalidomide including but not limited to birth defects, anxiety, weakness, chest pain, dizziness, cough and severe allergy.
High Dose Vitamin A Pregnancy And Lactation Text: High dose vitamin A therapy is contraindicated during pregnancy and breast feeding.
Protopic Pregnancy And Lactation Text: This medication is Pregnancy Category C. It is unknown if this medication is excreted in breast milk when applied topically.
Ilumya Counseling: I discussed with the patient the risks of tildrakizumab including but not limited to immunosuppression, malignancy, posterior leukoencephalopathy syndrome, and serious infections.  The patient understands that monitoring is required including a PPD at baseline and must alert us or the primary physician if symptoms of infection or other concerning signs are noted.
SSKI Counseling:  I discussed with the patient the risks of SSKI including but not limited to thyroid abnormalities, metallic taste, GI upset, fever, headache, acne, arthralgias, paraesthesias, lymphadenopathy, easy bleeding, arrhythmias, and allergic reaction.
Minocycline Counseling: Patient advised regarding possible photosensitivity and discoloration of the teeth, skin, lips, tongue and gums.  Patient instructed to avoid sunlight, if possible.  When exposed to sunlight, patients should wear protective clothing, sunglasses, and sunscreen.  The patient was instructed to call the office immediately if the following severe adverse effects occur:  hearing changes, easy bruising/bleeding, severe headache, or vision changes.  The patient verbalized understanding of the proper use and possible adverse effects of minocycline.  All of the patient's questions and concerns were addressed.

## 2021-04-08 ENCOUNTER — APPOINTMENT (RX ONLY)
Dept: URBAN - METROPOLITAN AREA CLINIC 81 | Facility: CLINIC | Age: 86
Setting detail: DERMATOLOGY
End: 2021-04-08

## 2021-04-08 DIAGNOSIS — M12.9 ARTHROPATHY, UNSPECIFIED: ICD-10-CM | Status: STABLE

## 2021-04-08 DIAGNOSIS — L40.0 PSORIASIS VULGARIS: ICD-10-CM

## 2021-04-08 DIAGNOSIS — Z79.899 OTHER LONG TERM (CURRENT) DRUG THERAPY: ICD-10-CM

## 2021-04-08 DIAGNOSIS — R79.89 OTHER SPECIFIED ABNORMAL FINDINGS OF BLOOD CHEMISTRY: ICD-10-CM

## 2021-04-08 PROCEDURE — ? PRESCRIPTION

## 2021-04-08 PROCEDURE — ? HIGH RISK MEDICATION MONITORING

## 2021-04-08 PROCEDURE — ? LAB REPORTS REVIEWED

## 2021-04-08 PROCEDURE — 99214 OFFICE O/P EST MOD 30 MIN: CPT

## 2021-04-08 PROCEDURE — ? TREATMENT REGIMEN

## 2021-04-08 PROCEDURE — ? ENBREL MONITORING

## 2021-04-08 PROCEDURE — ? COUNSELING

## 2021-04-08 RX ORDER — ETANERCEPT 50 MG/ML
SOLUTION SUBCUTANEOUS
Qty: 1 | Refills: 3 | Status: ERX | COMMUNITY
Start: 2021-04-08

## 2021-04-08 RX ADMIN — ETANERCEPT: 50 SOLUTION SUBCUTANEOUS at 00:00

## 2021-04-08 ASSESSMENT — PGA PSORIASIS: PGA PSORIASIS 2020: CLEAR

## 2021-04-08 ASSESSMENT — LOCATION ZONE DERM
LOCATION ZONE: ANUS
LOCATION ZONE: FINGER

## 2021-04-08 ASSESSMENT — LOCATION DETAILED DESCRIPTION DERM
LOCATION DETAILED: RIGHT PROXIMAL RADIAL THUMB
LOCATION DETAILED: PERIANAL SKIN
LOCATION DETAILED: LEFT PROXIMAL RADIAL THUMB

## 2021-04-08 ASSESSMENT — LOCATION SIMPLE DESCRIPTION DERM
LOCATION SIMPLE: LEFT THUMB
LOCATION SIMPLE: RIGHT THUMB
LOCATION SIMPLE: PERIANAL SKIN

## 2021-04-08 ASSESSMENT — BSA PSORIASIS: % BODY COVERED IN PSORIASIS: 0

## 2021-04-08 NOTE — PROCEDURE: TREATMENT REGIMEN
Action 4: Continue
Otc Regimen: Gentle cleanser daily (for perianal irritation)
Other Instructions: Labs due 9/2021\\nNext OV due 9/2021
Detail Level: Zone
Continue Regimen: Enbrel 50mg/mL sc injections every week

## 2021-04-08 NOTE — PROCEDURE: COUNSELING
Detail Level: Simple
Quality 410: Psoriasis Clinical Response To Oral Systemic Or Biologic Mediations: Psoriasis Assessment Tool Documented, Met Specified Benchmark
Quality 337: Tuberculosis Prevention For Psoriasis And Psoriatic Arthritis Patients On A Biological Immune Response Modifier: Patients has documentation of the management of a positive TB screening test with no evidence of active tuberculosis, confirmed through use of radiographic imaging (i.e., chest x -ray, CT)
Detail Level: Detailed

## 2021-04-08 NOTE — PROCEDURE: LAB REPORTS REVIEWED
Summarized Lab Results: + protein\\n\\nPatient will follow up with PCP
Detail Level: Zone
Labs Reviewed Override: CARLOTTA

## 2021-08-23 ENCOUNTER — RX ONLY (OUTPATIENT)
Age: 86
Setting detail: RX ONLY
End: 2021-08-23

## 2021-08-23 RX ORDER — ETANERCEPT 50 MG/ML
SOLUTION SUBCUTANEOUS WEEKLY
Qty: 4 | Refills: 3 | Status: ERX

## 2021-10-13 ENCOUNTER — RX ONLY (OUTPATIENT)
Age: 86
Setting detail: RX ONLY
End: 2021-10-13

## 2021-10-13 ENCOUNTER — APPOINTMENT (RX ONLY)
Dept: URBAN - METROPOLITAN AREA CLINIC 81 | Facility: CLINIC | Age: 86
Setting detail: DERMATOLOGY
End: 2021-10-13

## 2021-10-13 DIAGNOSIS — L40.0 PSORIASIS VULGARIS: ICD-10-CM | Status: WELL CONTROLLED

## 2021-10-13 DIAGNOSIS — M12.9 ARTHROPATHY, UNSPECIFIED: ICD-10-CM

## 2021-10-13 DIAGNOSIS — L82.1 OTHER SEBORRHEIC KERATOSIS: ICD-10-CM

## 2021-10-13 DIAGNOSIS — L81.0 POSTINFLAMMATORY HYPERPIGMENTATION: ICD-10-CM

## 2021-10-13 DIAGNOSIS — Z79.899 OTHER LONG TERM (CURRENT) DRUG THERAPY: ICD-10-CM

## 2021-10-13 PROCEDURE — ? ENBREL MONITORING

## 2021-10-13 PROCEDURE — ? COUNSELING

## 2021-10-13 PROCEDURE — ? HIGH RISK MEDICATION MONITORING

## 2021-10-13 PROCEDURE — 99214 OFFICE O/P EST MOD 30 MIN: CPT

## 2021-10-13 PROCEDURE — ? ORDER TESTS

## 2021-10-13 PROCEDURE — ? PRESCRIPTION MEDICATION MANAGEMENT

## 2021-10-13 RX ORDER — ETANERCEPT 50 MG/ML
SOLUTION SUBCUTANEOUS
Qty: 12 | Refills: 2 | Status: ERX | COMMUNITY
Start: 2021-10-13

## 2021-10-13 ASSESSMENT — LOCATION ZONE DERM
LOCATION ZONE: EAR
LOCATION ZONE: FINGER
LOCATION ZONE: TRUNK
LOCATION ZONE: SCALP

## 2021-10-13 ASSESSMENT — LOCATION DETAILED DESCRIPTION DERM
LOCATION DETAILED: LEFT PROXIMAL RADIAL THUMB
LOCATION DETAILED: INFERIOR THORACIC SPINE
LOCATION DETAILED: RIGHT PROXIMAL RADIAL THUMB
LOCATION DETAILED: RIGHT SCAPHA
LOCATION DETAILED: MID-FRONTAL SCALP

## 2021-10-13 ASSESSMENT — BSA PSORIASIS: % BODY COVERED IN PSORIASIS: 0

## 2021-10-13 ASSESSMENT — PAIN INTENSITY VAS: HOW INTENSE IS YOUR PAIN 0 BEING NO PAIN, 10 BEING THE MOST SEVERE PAIN POSSIBLE?: NO PAIN

## 2021-10-13 ASSESSMENT — LOCATION SIMPLE DESCRIPTION DERM
LOCATION SIMPLE: ANTERIOR SCALP
LOCATION SIMPLE: LEFT THUMB
LOCATION SIMPLE: RIGHT THUMB
LOCATION SIMPLE: UPPER BACK
LOCATION SIMPLE: RIGHT EAR

## 2021-10-13 ASSESSMENT — PGA PSORIASIS: PGA PSORIASIS 2020: CLEAR

## 2021-10-13 ASSESSMENT — SEVERITY ASSESSMENT: SEVERITY: MILD

## 2021-10-13 NOTE — PROCEDURE: COUNSELING
Detail Level: Simple
Quality 410: Psoriasis Clinical Response To Oral Systemic Or Biologic Mediations: Psoriasis Assessment Tool Documented, Met Specified Benchmark
Quality 337: Tuberculosis Prevention For Psoriasis And Psoriatic Arthritis Patients On A Biological Immune Response Modifier: Patients has documentation of the management of a positive TB screening test with no evidence of active tuberculosis, confirmed through use of radiographic imaging (i.e., chest x -ray, CT)
Detail Level: Detailed
Detail Level: Zone

## 2021-10-13 NOTE — PROCEDURE: PRESCRIPTION MEDICATION MANAGEMENT
Render In Strict Bullet Format?: No
Plan: Gentle cleanser daily.\\nMoisturizer.\\n\\nLast labs 3/2021 - due 3/2022.\\nTSpot not needed.\\nOrder given to patient for labs.
Continue Regimen: Enbrel 50mg - inject SC every week.
Detail Level: Zone

## 2021-10-13 NOTE — PROCEDURE: MIPS QUALITY
Quality 110: Preventive Care And Screening: Influenza Immunization: Influenza Immunization Administered during Influenza season
Quality 337: Tuberculosis Prevention For Psoriasis And Psoriatic Arthritis Patients On A Biological Immune Response Modifier: Patients has documentation of the management of a positive TB screening test with no evidence of active tuberculosis, confirmed through use of radiographic imaging (i.e., chest x -ray, CT)
Quality 226: Preventive Care And Screening: Tobacco Use: Screening And Cessation Intervention: Patient screened for tobacco use and is an ex/non-smoker
Quality 111:Pneumonia Vaccination Status For Older Adults: Pneumococcal Vaccination Previously Received
Detail Level: Detailed
Quality 410: Psoriasis Clinical Response To Oral Systemic Or Biologic Mediations: Psoriasis Assessment Tool Documented, Met Specified Benchmark
Quality 130: Documentation Of Current Medications In The Medical Record: Current Medications Documented

## 2022-07-13 ENCOUNTER — RX ONLY (OUTPATIENT)
Age: 87
Setting detail: RX ONLY
End: 2022-07-13

## 2022-07-13 RX ORDER — ETANERCEPT 50 MG/ML
SOLUTION SUBCUTANEOUS
Qty: 4 | Refills: 1 | Status: ERX

## 2022-12-09 ENCOUNTER — APPOINTMENT (RX ONLY)
Dept: URBAN - METROPOLITAN AREA CLINIC 81 | Facility: CLINIC | Age: 87
Setting detail: DERMATOLOGY
End: 2022-12-09

## 2022-12-09 DIAGNOSIS — L40.8 OTHER PSORIASIS: ICD-10-CM

## 2022-12-09 DIAGNOSIS — R79.89 OTHER SPECIFIED ABNORMAL FINDINGS OF BLOOD CHEMISTRY: ICD-10-CM

## 2022-12-09 DIAGNOSIS — L738 OTHER SPECIFIED DISEASES OF HAIR AND HAIR FOLLICLES: ICD-10-CM

## 2022-12-09 DIAGNOSIS — L663 OTHER SPECIFIED DISEASES OF HAIR AND HAIR FOLLICLES: ICD-10-CM

## 2022-12-09 DIAGNOSIS — L40.0 PSORIASIS VULGARIS: ICD-10-CM

## 2022-12-09 DIAGNOSIS — L73.9 FOLLICULAR DISORDER, UNSPECIFIED: ICD-10-CM

## 2022-12-09 DIAGNOSIS — Z79.899 OTHER LONG TERM (CURRENT) DRUG THERAPY: ICD-10-CM

## 2022-12-09 DIAGNOSIS — M12.9 ARTHROPATHY, UNSPECIFIED: ICD-10-CM

## 2022-12-09 PROBLEM — L02.821 FURUNCLE OF HEAD [ANY PART, EXCEPT FACE]: Status: ACTIVE | Noted: 2022-12-09

## 2022-12-09 PROCEDURE — ? DIAGNOSIS COMMENT

## 2022-12-09 PROCEDURE — ? ENBREL MONITORING

## 2022-12-09 PROCEDURE — ? PRESCRIPTION MEDICATION MANAGEMENT

## 2022-12-09 PROCEDURE — ? HIGH RISK MEDICATION MONITORING

## 2022-12-09 PROCEDURE — ? COUNSELING

## 2022-12-09 PROCEDURE — 99214 OFFICE O/P EST MOD 30 MIN: CPT

## 2022-12-09 PROCEDURE — ? PRESCRIPTION

## 2022-12-09 RX ORDER — MOMETASONE FUROATE 1 MG/G
OINTMENT TOPICAL BID
Qty: 45 | Refills: 3 | Status: ERX | COMMUNITY
Start: 2022-12-09

## 2022-12-09 RX ORDER — CLINDAMYCIN PHOSPHATE 10 MG/ML
SOLUTION TOPICAL BID
Qty: 60 | Refills: 3 | Status: ERX | COMMUNITY
Start: 2022-12-09

## 2022-12-09 RX ADMIN — CLINDAMYCIN PHOSPHATE: 10 SOLUTION TOPICAL at 00:00

## 2022-12-09 RX ADMIN — MOMETASONE FUROATE: 1 OINTMENT TOPICAL at 00:00

## 2022-12-09 ASSESSMENT — LOCATION DETAILED DESCRIPTION DERM
LOCATION DETAILED: MID-OCCIPITAL SCALP
LOCATION DETAILED: LEFT PROXIMAL RADIAL THUMB
LOCATION DETAILED: MID-FRONTAL SCALP
LOCATION DETAILED: LEFT PERIANAL SKIN
LOCATION DETAILED: RIGHT PROXIMAL RADIAL THUMB

## 2022-12-09 ASSESSMENT — LOCATION ZONE DERM
LOCATION ZONE: ANUS
LOCATION ZONE: FINGER
LOCATION ZONE: SCALP

## 2022-12-09 ASSESSMENT — LOCATION SIMPLE DESCRIPTION DERM
LOCATION SIMPLE: LEFT THUMB
LOCATION SIMPLE: ANUS
LOCATION SIMPLE: POSTERIOR SCALP
LOCATION SIMPLE: ANTERIOR SCALP
LOCATION SIMPLE: RIGHT THUMB

## 2022-12-09 NOTE — PROCEDURE: DIAGNOSIS COMMENT
Comment: He had GI bleed in the last year
Detail Level: Simple
Render Risk Assessment In Note?: no
Comment: Ordered SPE on stored sera\\nFax results to primary care MD

## 2022-12-09 NOTE — PROCEDURE: COUNSELING
Detail Level: Detailed
Detail Level: Simple
Quality 410: Psoriasis Clinical Response To Oral Systemic Or Biologic Mediations: Psoriasis Assessment Tool Documented, Met Specified Benchmark
Quality 337: Tuberculosis Prevention For Psoriasis And Psoriatic Arthritis Patients On A Biological Immune Response Modifier: Patients has documentation of the management of a positive TB screening test with no evidence of active tuberculosis, confirmed through use of radiographic imaging (i.e., chest x -ray, CT)

## 2022-12-09 NOTE — PROCEDURE: PRESCRIPTION MEDICATION MANAGEMENT
Render In Strict Bullet Format?: No
Detail Level: Zone
Initiate Treatment: Mometasone ointment- apply BID to anal area for up to 10 days, wait 7 to reuse
Initiate Treatment: Clindamycin phosphate 1% topical solution - apply BID to affected areas on posterior scalp as needed
Plan: Gentle cleanser daily.\\nMoisturizer.\\n\\nLast labs  12/6/22- due 12//2023\\nTSpot not needed.
Continue Regimen: Enbrel 50mg - inject SC every week.
Samples Given: #2

## 2023-02-21 ENCOUNTER — RX ONLY (OUTPATIENT)
Age: 88
Setting detail: RX ONLY
End: 2023-02-21

## 2023-02-21 RX ORDER — ETANERCEPT 50 MG/ML
SOLUTION SUBCUTANEOUS
Qty: 12 | Refills: 0 | Status: CANCELLED

## 2023-02-21 RX ORDER — ETANERCEPT 50 MG/ML
SOLUTION SUBCUTANEOUS QWEEK
Qty: 12 | Refills: 2 | Status: ERX

## 2023-12-27 ENCOUNTER — RX ONLY (OUTPATIENT)
Age: 88
Setting detail: RX ONLY
End: 2023-12-27

## 2023-12-27 ENCOUNTER — APPOINTMENT (RX ONLY)
Dept: URBAN - METROPOLITAN AREA CLINIC 81 | Facility: CLINIC | Age: 88
Setting detail: DERMATOLOGY
End: 2023-12-27

## 2023-12-27 DIAGNOSIS — L73.9 FOLLICULAR DISORDER, UNSPECIFIED: ICD-10-CM | Status: WELL CONTROLLED

## 2023-12-27 DIAGNOSIS — L663 OTHER SPECIFIED DISEASES OF HAIR AND HAIR FOLLICLES: ICD-10-CM | Status: WELL CONTROLLED

## 2023-12-27 DIAGNOSIS — L738 OTHER SPECIFIED DISEASES OF HAIR AND HAIR FOLLICLES: ICD-10-CM | Status: WELL CONTROLLED

## 2023-12-27 DIAGNOSIS — R79.89 OTHER SPECIFIED ABNORMAL FINDINGS OF BLOOD CHEMISTRY: ICD-10-CM

## 2023-12-27 DIAGNOSIS — L82.0 INFLAMED SEBORRHEIC KERATOSIS: ICD-10-CM

## 2023-12-27 DIAGNOSIS — Z79.899 OTHER LONG TERM (CURRENT) DRUG THERAPY: ICD-10-CM

## 2023-12-27 DIAGNOSIS — L40.0 PSORIASIS VULGARIS: ICD-10-CM | Status: WELL CONTROLLED

## 2023-12-27 PROBLEM — L02.821 FURUNCLE OF HEAD [ANY PART, EXCEPT FACE]: Status: ACTIVE | Noted: 2023-12-27

## 2023-12-27 PROCEDURE — ? ENBREL MONITORING

## 2023-12-27 PROCEDURE — 99214 OFFICE O/P EST MOD 30 MIN: CPT | Mod: 25

## 2023-12-27 PROCEDURE — ? DIAGNOSIS COMMENT

## 2023-12-27 PROCEDURE — ? HIGH RISK MEDICATION MONITORING

## 2023-12-27 PROCEDURE — 17110 DESTRUCTION B9 LES UP TO 14: CPT

## 2023-12-27 PROCEDURE — ? PRESCRIPTION MEDICATION MANAGEMENT

## 2023-12-27 PROCEDURE — ? ORDER TESTS

## 2023-12-27 PROCEDURE — ? COUNSELING

## 2023-12-27 PROCEDURE — ? PRESCRIPTION

## 2023-12-27 PROCEDURE — ? LIQUID NITROGEN

## 2023-12-27 RX ORDER — CLINDAMYCIN PHOSPHATE 10 MG/ML
SOLUTION TOPICAL BID
Qty: 60 | Refills: 3 | Status: CANCELLED
Stop reason: SDUPTHER

## 2023-12-27 RX ORDER — CLINDAMYCIN PHOSPHATE 10 MG/ML
SOLUTION TOPICAL BID
Qty: 60 | Refills: 3 | Status: ERX

## 2023-12-27 RX ORDER — ETANERCEPT 50 MG/ML
SOLUTION SUBCUTANEOUS QWEEK
Qty: 12 | Refills: 2 | Status: ERX

## 2023-12-27 ASSESSMENT — SEVERITY ASSESSMENT: SEVERITY: ALMOST CLEAR

## 2023-12-27 ASSESSMENT — LOCATION DETAILED DESCRIPTION DERM
LOCATION DETAILED: MID-OCCIPITAL SCALP
LOCATION DETAILED: MID-FRONTAL SCALP
LOCATION DETAILED: RIGHT LATERAL FRONTAL SCALP

## 2023-12-27 ASSESSMENT — LOCATION SIMPLE DESCRIPTION DERM
LOCATION SIMPLE: POSTERIOR SCALP
LOCATION SIMPLE: RIGHT SCALP
LOCATION SIMPLE: ANTERIOR SCALP

## 2023-12-27 ASSESSMENT — PGA PSORIASIS: PGA PSORIASIS 2020: CLEAR

## 2023-12-27 ASSESSMENT — LOCATION ZONE DERM: LOCATION ZONE: SCALP

## 2023-12-27 NOTE — PROCEDURE: MIPS QUALITY
Quality 110: Preventive Care And Screening: Influenza Immunization: Influenza Immunization Administered during Influenza season
Quality 337: Tuberculosis Prevention For Psoriasis And Psoriatic Arthritis Patients On A Biological Immune Response Modifier: Patients has documentation of the management of a positive TB screening test with no evidence of active tuberculosis, confirmed through use of radiographic imaging (i.e., chest x -ray, CT)
Quality 226: Preventive Care And Screening: Tobacco Use: Screening And Cessation Intervention: Patient screened for tobacco use and is an ex/non-smoker
Quality 111:Pneumonia Vaccination Status For Older Adults: Pneumococcal Vaccination Previously Received
Detail Level: Detailed
Quality 410: Psoriasis Clinical Response To Oral Systemic Or Biologic Medications: Psoriasis Assessment Tool Documented, Met Specified Benchmark
Quality 130: Documentation Of Current Medications In The Medical Record: Current Medications Documented

## 2023-12-27 NOTE — PROCEDURE: COUNSELING
Detail Level: Simple
Quality 410: Psoriasis Clinical Response To Oral Systemic Or Biologic Medications: Psoriasis Assessment Tool Documented, Met Specified Benchmark
Quality 337: Tuberculosis Prevention For Psoriasis And Psoriatic Arthritis Patients On A Biological Immune Response Modifier: Patients has documentation of the management of a positive TB screening test with no evidence of active tuberculosis, confirmed through use of radiographic imaging (i.e., chest x -ray, CT)
Detail Level: Detailed

## 2023-12-27 NOTE — PROCEDURE: DIAGNOSIS COMMENT
Comment: Patient had GI bleed in 2021
Detail Level: Simple
Render Risk Assessment In Note?: no
Comment: Patient followed up with PCP and was referred to Nephrologist. Patient will have his nephrologist send over office visit notes and kidney biopsy result. Per patient, bx result was normal.

## 2023-12-27 NOTE — PROCEDURE: PRESCRIPTION MEDICATION MANAGEMENT
Render In Strict Bullet Format?: No
Plan: Gentle cleanser daily.\\nMoisturizer.\\n\\nNew Lab order was given to patient today.\\nWill refill Embrel once lab results have been reviewed.
Continue Regimen: Enbrel 50mg - inject SC every week.
Samples Given: #1
Detail Level: Zone
Continue Regimen: as needed:\\nClindamycin phosphate 1% topical solution - apply BID to affected areas on posterior scalp as needed

## 2023-12-27 NOTE — PROCEDURE: ORDER TESTS
Expected Date Of Service: 12/27/2023
Bill For Surgical Tray: no
Billing Type: Client Bill
Performing Laboratory: 0

## 2024-04-15 ENCOUNTER — RX ONLY (OUTPATIENT)
Age: 89
Setting detail: RX ONLY
End: 2024-04-15

## 2024-04-15 RX ORDER — ETANERCEPT 50 MG/ML
SOLUTION SUBCUTANEOUS QWEEK
Qty: 12 | Refills: 2 | Status: CANCELLED

## 2024-10-01 ENCOUNTER — RX ONLY (OUTPATIENT)
Age: 89
Setting detail: RX ONLY
End: 2024-10-01

## 2024-10-01 RX ORDER — ETANERCEPT 50 MG/ML
SOLUTION SUBCUTANEOUS QWEEK
Qty: 2 | Refills: 0 | Status: ERX

## 2024-10-02 ENCOUNTER — APPOINTMENT (RX ONLY)
Dept: URBAN - METROPOLITAN AREA CLINIC 81 | Facility: CLINIC | Age: 89
Setting detail: DERMATOLOGY
End: 2024-10-02

## 2024-10-02 DIAGNOSIS — Z79.899 OTHER LONG TERM (CURRENT) DRUG THERAPY: ICD-10-CM

## 2024-10-02 PROCEDURE — ? ORDER TESTS

## 2024-10-02 NOTE — PROCEDURE: ORDER TESTS
Expected Date Of Service: 10/02/2024
Lab Facility: [id number]
Performing Laboratory: -[id number]
Billing Type: Client Bill
Bill For Surgical Tray: no

## 2024-10-09 ENCOUNTER — APPOINTMENT (RX ONLY)
Dept: URBAN - METROPOLITAN AREA CLINIC 81 | Facility: CLINIC | Age: 89
Setting detail: DERMATOLOGY
End: 2024-10-09

## 2024-10-09 ENCOUNTER — RX ONLY (OUTPATIENT)
Age: 89
Setting detail: RX ONLY
End: 2024-10-09

## 2024-10-09 DIAGNOSIS — L40.0 PSORIASIS VULGARIS: ICD-10-CM

## 2024-10-09 DIAGNOSIS — R79.89 OTHER SPECIFIED ABNORMAL FINDINGS OF BLOOD CHEMISTRY: ICD-10-CM

## 2024-10-09 DIAGNOSIS — L82.1 OTHER SEBORRHEIC KERATOSIS: ICD-10-CM

## 2024-10-09 PROCEDURE — ? PRESCRIPTION MEDICATION MANAGEMENT

## 2024-10-09 PROCEDURE — ? COUNSELING

## 2024-10-09 PROCEDURE — ? ADDITIONAL NOTES

## 2024-10-09 PROCEDURE — 99213 OFFICE O/P EST LOW 20 MIN: CPT

## 2024-10-09 RX ORDER — MOMETASONE FUROATE 1 MG/G
OINTMENT TOPICAL BID
Qty: 45 | Refills: 3 | Status: ERX

## 2024-10-09 ASSESSMENT — LOCATION SIMPLE DESCRIPTION DERM
LOCATION SIMPLE: RIGHT EAR
LOCATION SIMPLE: ANTERIOR SCALP

## 2024-10-09 ASSESSMENT — LOCATION DETAILED DESCRIPTION DERM
LOCATION DETAILED: RIGHT SUPERIOR HELIX
LOCATION DETAILED: MID-FRONTAL SCALP

## 2024-10-09 ASSESSMENT — LOCATION ZONE DERM
LOCATION ZONE: SCALP
LOCATION ZONE: EAR

## 2024-10-09 ASSESSMENT — BSA PSORIASIS: % BODY COVERED IN PSORIASIS: 1

## 2024-10-09 NOTE — PROCEDURE: PRESCRIPTION MEDICATION MANAGEMENT
Render In Strict Bullet Format?: No
Plan: Discussed Gentle cleanser daily and Moisturizer Cetaphil\\nLast labs 10/7/24
Continue Regimen: Enbrel 50mg - inject SC every week.\\n\\nMometasone 0.1% topical ointment - Apply topically to perianal area BID for up to 10 days, take 1 week off before restarting.
Detail Level: Zone

## 2024-10-09 NOTE — PROCEDURE: COUNSELING
Detail Level: Simple
Quality 410: Psoriasis Clinical Response To Oral Systemic Or Biologic Medications: Psoriasis Assessment Tool Documented, Met Specified Benchmark
Quality 337: Tuberculosis Prevention For Psoriasis And Psoriatic Arthritis Patients On A Biological Immune Response Modifier: Patients has documentation of the management of a positive TB screening test with no evidence of active tuberculosis, confirmed through use of radiographic imaging (i.e., chest x -ray, CT)

## 2024-10-09 NOTE — PROCEDURE: ADDITIONAL NOTES
Additional Notes: Protein in urine. Pt is following up with his pcp
Render Risk Assessment In Note?: no
Detail Level: Simple

## 2025-02-12 ENCOUNTER — RX ONLY (RX ONLY)
Age: 89
End: 2025-02-12

## 2025-02-12 RX ORDER — ETANERCEPT 50 MG/ML
SOLUTION SUBCUTANEOUS QWEEK
Qty: 12 | Refills: 1 | Status: ERX